# Patient Record
Sex: MALE | Race: WHITE | NOT HISPANIC OR LATINO | URBAN - METROPOLITAN AREA
[De-identification: names, ages, dates, MRNs, and addresses within clinical notes are randomized per-mention and may not be internally consistent; named-entity substitution may affect disease eponyms.]

---

## 2018-11-26 ENCOUNTER — OFFICE VISIT - HEALTHEAST (OUTPATIENT)
Dept: INTERNAL MEDICINE | Facility: CLINIC | Age: 28
End: 2018-11-26

## 2018-11-26 ENCOUNTER — COMMUNICATION - HEALTHEAST (OUTPATIENT)
Dept: TELEHEALTH | Facility: CLINIC | Age: 28
End: 2018-11-26

## 2018-11-26 DIAGNOSIS — Z00.00 ENCOUNTER FOR GENERAL HEALTH EXAMINATION: ICD-10-CM

## 2018-11-26 LAB
ALBUMIN SERPL-MCNC: 4.5 G/DL (ref 3.5–5)
ALP SERPL-CCNC: 65 U/L (ref 45–120)
ALT SERPL W P-5'-P-CCNC: 26 U/L (ref 0–45)
ANION GAP SERPL CALCULATED.3IONS-SCNC: 9 MMOL/L (ref 5–18)
AST SERPL W P-5'-P-CCNC: 22 U/L (ref 0–40)
BASOPHILS # BLD AUTO: 0 THOU/UL (ref 0–0.2)
BASOPHILS NFR BLD AUTO: 0 % (ref 0–2)
BILIRUB SERPL-MCNC: 1.2 MG/DL (ref 0–1)
BUN SERPL-MCNC: 12 MG/DL (ref 8–22)
CALCIUM SERPL-MCNC: 10.2 MG/DL (ref 8.5–10.5)
CHLORIDE BLD-SCNC: 103 MMOL/L (ref 98–107)
CO2 SERPL-SCNC: 29 MMOL/L (ref 22–31)
CREAT SERPL-MCNC: 0.68 MG/DL (ref 0.7–1.3)
EOSINOPHIL # BLD AUTO: 0.1 THOU/UL (ref 0–0.4)
EOSINOPHIL NFR BLD AUTO: 2 % (ref 0–6)
ERYTHROCYTE [DISTWIDTH] IN BLOOD BY AUTOMATED COUNT: 11.1 % (ref 11–14.5)
GFR SERPL CREATININE-BSD FRML MDRD: >60 ML/MIN/1.73M2
GLUCOSE BLD-MCNC: 94 MG/DL (ref 70–125)
HCT VFR BLD AUTO: 42.7 % (ref 40–54)
HGB BLD-MCNC: 14.6 G/DL (ref 14–18)
LYMPHOCYTES # BLD AUTO: 1.6 THOU/UL (ref 0.8–4.4)
LYMPHOCYTES NFR BLD AUTO: 35 % (ref 20–40)
MCH RBC QN AUTO: 30.1 PG (ref 27–34)
MCHC RBC AUTO-ENTMCNC: 34.2 G/DL (ref 32–36)
MCV RBC AUTO: 88 FL (ref 80–100)
MONOCYTES # BLD AUTO: 0.3 THOU/UL (ref 0–0.9)
MONOCYTES NFR BLD AUTO: 6 % (ref 2–10)
NEUTROPHILS # BLD AUTO: 2.7 THOU/UL (ref 2–7.7)
NEUTROPHILS NFR BLD AUTO: 57 % (ref 50–70)
PLATELET # BLD AUTO: 300 THOU/UL (ref 140–440)
PMV BLD AUTO: 6.8 FL (ref 7–10)
POTASSIUM BLD-SCNC: 4.1 MMOL/L (ref 3.5–5)
PROT SERPL-MCNC: 7.8 G/DL (ref 6–8)
RBC # BLD AUTO: 4.85 MILL/UL (ref 4.4–6.2)
SODIUM SERPL-SCNC: 141 MMOL/L (ref 136–145)
WBC: 4.7 THOU/UL (ref 4–11)

## 2018-11-26 ASSESSMENT — MIFFLIN-ST. JEOR: SCORE: 1739.29

## 2018-11-27 ENCOUNTER — COMMUNICATION - HEALTHEAST (OUTPATIENT)
Dept: INTERNAL MEDICINE | Facility: CLINIC | Age: 28
End: 2018-11-27

## 2021-06-02 VITALS — BODY MASS INDEX: 24.52 KG/M2 | HEIGHT: 70 IN | WEIGHT: 171.3 LBS

## 2021-06-21 NOTE — PROGRESS NOTES
ASSESSMENT/PLAN:    1. Encounter for general health examination  Active life style, marathoner.  On exam and history today, no active issues. His abdominal exam is benign without indication of hernia or other abnormality. The suprapubic discomfort is likely myofascial.  Can follow up if progresses or fails to improve.   - Comprehensive Metabolic Panel  - HM1(CBC and Differential)       Give Tdap today    CHIEF COMPLAINT:  Chief Complaint   Patient presents with     [facility]     Annual Exam     HISTORY OF PRESENT ILLNESS:  Uriah is a 28 y.o. male presenting to the clinic today for general health evaluation.  He feels well.  Has a very minor discomfort over the suprapubic area intermittently.  Not fully a pain, and no voiding symptoms, nausea or abnormal bowel function.  No high risk behavior or known exposure to STD.  No recent illness or fever, or dyspnea, or chest pain.     REVIEW OF SYSTEMS:   Constitutional: no fever, chills, or sweats  Respiratory: No wheezes, cough, shortness of breath  Cardiovascular: No chest pain or palpitations  Gastrointestinal: No nausea, vomiting, diarrhea, dyspepsia, or pain  Genitourinary: No urgency, frequency, or dysuria  Musculoskeletal: No joint swelling, pain, or unusual back pain  Neurological: No headache, arm or leg numbness or weakness, or gait disturbance  Psychiatric: No anxiety, or depression, or concerns   Endocrine: No unusual fatigue, or appetite disturbance, or sleep disturbance, unusual weight loss or gain  Allergic/Immunologic: No hives, allergic swelling or wheeze or rhinitis  All other systems on reveiw are negative.    PFSH:    Social History     Tobacco Use   Smoking Status Never Smoker   Smokeless Tobacco Never Used     Family History   Problem Relation Age of Onset     Coronary artery disease Father      Hyperlipidemia Father      Skin cancer Maternal Grandfather      Social History     Socioeconomic History     Marital status: Single     Spouse name: Not  "on file     Number of children: Not on file     Years of education: Not on file     Highest education level: Not on file   Social Needs     Financial resource strain: Not on file     Food insecurity - worry: Not on file     Food insecurity - inability: Not on file     Transportation needs - medical: Not on file     Transportation needs - non-medical: Not on file   Occupational History     Occupation: financial aid counselor   Tobacco Use     Smoking status: Never Smoker     Smokeless tobacco: Never Used   Substance and Sexual Activity     Alcohol use: Yes     Alcohol/week: 0.6 oz     Types: 1 Cans of beer per week     Drug use: Not on file     Sexual activity: Not on file   Other Topics Concern     Not on file   Social History Narrative    Runner, marathoner.      Past Surgical History:   Procedure Laterality Date     NO PAST SURGERIES       No Known Allergies    Active Ambulatory Problems     Diagnosis Date Noted     No Active Ambulatory Problems     Resolved Ambulatory Problems     Diagnosis Date Noted     No Resolved Ambulatory Problems     No Additional Past Medical History     VITALS:  Vitals:    11/26/18 1026   BP: 130/70   Patient Site: Left Arm   Patient Position: Sitting   Cuff Size: Adult Regular   Pulse: (!) 57   SpO2: 98%   Weight: 171 lb 4.8 oz (77.7 kg)   Height: 5' 9.75\" (1.772 m)     Wt Readings from Last 3 Encounters:   11/26/18 171 lb 4.8 oz (77.7 kg)     Body mass index is 24.76 kg/m .    PHYSICAL EXAM:  General Appearance: In no acute distress  /70 (Patient Site: Left Arm, Patient Position: Sitting, Cuff Size: Adult Regular)   Pulse (!) 57   Ht 5' 9.75\" (1.772 m)   Wt 171 lb 4.8 oz (77.7 kg)   SpO2 98%   BMI 24.76 kg/m    EYES: Clear, without inflammation, fundi are normal   HEENT: Without congestion or inflammation  NECK:  supple, without adenopathy or thryroid enlargement  RESPIRATORY: Clear to auscultation, no wheezes or rales  CARDIOVASCULAR: S1, S2, without murmur  ABDOMEN: soft, " flat, and non-tender, without mass, rebound, or guarding  RECTAL: deferred  GENITOURINARY: testes and phallus are normal, no hernia either side  MUSCULOSKELETAL: No joint swelling, or inflammation  PERIPHERAL PULSES:  full, no edema  NEUROLOGIC: Non-focal, no arm or leg  weakness, speech is clear  PSYCHIATRIC: Oriented X 3, without confusion, behavior and affect normal